# Patient Record
Sex: MALE | Race: WHITE
[De-identification: names, ages, dates, MRNs, and addresses within clinical notes are randomized per-mention and may not be internally consistent; named-entity substitution may affect disease eponyms.]

---

## 2017-12-23 ENCOUNTER — HOSPITAL ENCOUNTER (EMERGENCY)
Dept: HOSPITAL 25 - UCCORT | Age: 37
Discharge: HOME | End: 2017-12-23
Payer: COMMERCIAL

## 2017-12-23 DIAGNOSIS — R05: Primary | ICD-10-CM

## 2017-12-23 DIAGNOSIS — M54.6: ICD-10-CM

## 2017-12-23 PROCEDURE — G0463 HOSPITAL OUTPT CLINIC VISIT: HCPCS

## 2017-12-23 PROCEDURE — 99202 OFFICE O/P NEW SF 15 MIN: CPT

## 2017-12-23 NOTE — UC
Respiratory Complaint HPI





- HPI Summary


HPI Summary: 





37 YEAR OLD MALE PRESENTS WITH COMPLAINS OF COUGH AND LEFT SIDED BACK PAIN. 





- History of Current Complaint


Stated Complaint: COUGH


Time Seen by Provider: 12/23/17 15:58


Hx Obtained From: Patient


Onset/Duration: Sudden Onset


Severity Initially: Moderate


Severity Currently: Moderate


Aggravating Factors: Exertion, Deep Breaths


Associated Signs And Symptoms: Positive: Wheezing





- Allergies/Home Medications


Allergies/Adverse Reactions: 


 Allergies











Allergy/AdvReac Type Severity Reaction Status Date / Time


 


Penicillins Allergy  Hives Verified 12/23/17 16:15


 


Sulfa Antibiotics Allergy  Swelling Verified 12/23/17 16:15





   Of  





   Face,Lips,&  





   Throat  











Home Medications: 


 Home Medications





Dextromethorphan HBr [Cough Relief] 15 mg PO ONCE PRN 12/23/17 [History 

Confirmed 12/23/17]


Guaifenesin [Guaifenesin ER] 600 mg PO ONCE PRN 12/23/17 [History Confirmed 12/ 23/17]


Multiple Vitamin [Multi Vitamin] 1 tab PO DAILY 12/23/17 [History Confirmed 12/ 23/17]











PMH/Surg Hx/FS Hx/Imm Hx


Previously Healthy: Yes





- Surgical History


Surgical History: None





- Family History


Known Family History: Positive: None





- Social History


Alcohol Use: None





Review of Systems


Constitutional: Negative


Skin: Negative


Eyes: Negative


ENT: Nasal Discharge, Sinus Congestion, Sinus Pain/Tenderness


Respiratory: Cough


Cardiovascular: Negative


Gastrointestinal: Negative


Genitourinary: Negative


Motor: Negative


Neurovascular: Negative


Musculoskeletal: Negative


Neurological: Negative


Psychological: Negative


All Other Systems Reviewed And Are Negative: Yes





Physical Exam


Triage Information Reviewed: Yes


Vital Signs Reviewed: Yes


Eye Exam: Normal


ENT: Positive: Pharyngeal erythema, Nasal congestion, Nasal drainage


Dental Exam: Normal


Neck exam: Normal


Neck: Positive: 1


Respiratory Exam: Normal


Cardiovascular Exam: Normal


Abdominal Exam: Normal


Musculoskeletal Exam: Normal


Neurological Exam: Normal


Psychological Exam: Normal


Skin Exam: Normal





Respiratory Course/Dx





- Differential Dx/Diagnosis


Provider Diagnoses: COUGH.  LEFT UPPER BACK PAIN





Discharge





- Discharge Plan


Condition: Stable


Disposition: HOME


Prescriptions: 


Albuterol HFA INHALER* [Ventolin HFA Inhaler*] 1 puff INH Q6H PRN #1 mdi


 PRN Reason: Wheezing


Azithromyxin COBY (NF) [Z-Coby (Zithromax) 250 mg tabs #6] 2 tab PO .TODAY, THEN 

1 DAILY #6 tab


Guaifenesin-Codeine [Cheratussin AC] 1 syp PO Q8H PRN #120 ml MDD 15 ml


 PRN Reason: Cough


LoraTADine TAB(NF) [Claritin 10 MG TAB(NF)] 10 mg PO DAILY #30 tab


Patient Education Materials:  Acute Bronchitis (ED)


Referrals: 


Martin Zarate DO [Primary Care Provider] -

## 2018-01-17 ENCOUNTER — HOSPITAL ENCOUNTER (EMERGENCY)
Dept: HOSPITAL 25 - UCCORT | Age: 38
Discharge: HOME | End: 2018-01-17
Payer: COMMERCIAL

## 2018-01-17 DIAGNOSIS — Z88.0: ICD-10-CM

## 2018-01-17 DIAGNOSIS — Z87.891: ICD-10-CM

## 2018-01-17 DIAGNOSIS — J40: Primary | ICD-10-CM

## 2018-01-17 DIAGNOSIS — Z88.2: ICD-10-CM

## 2018-01-17 PROCEDURE — 71046 X-RAY EXAM CHEST 2 VIEWS: CPT

## 2018-01-17 PROCEDURE — G0463 HOSPITAL OUTPT CLINIC VISIT: HCPCS

## 2018-01-17 PROCEDURE — 99212 OFFICE O/P EST SF 10 MIN: CPT

## 2018-01-17 NOTE — UC
Respiratory Complaint HPI





- HPI Summary


HPI Summary: 





Pt c/o cough and wheezing X 2 weeks. Was treated last week with Zpac and 

albuterol inhaler and loratadine.Pt works at a nursing home





- History of Current Complaint


Hx Obtained From: Patient


Onset/Duration: Gradual Onset, Lasting Weeks, Still Present


Timing: Intermittent Episodes


Severity Initially: Mild


Severity Currently: Moderate


Character: Cough: Nonproductive


Aggravating Factors: Exertion, Deep Breaths, Recumbent Position


Alleviating Factors: Nothing


Associated Signs And Symptoms: Positive: Wheezing, URI, Nasal Congestion





- Risk Factors


Pulmonary Embolism Risk Factors: Negative


Cardiac Risk Factors: Negative


Pseudomonas Risk Factors: Negative


Tuberculosis Risk Factors: Negative





<Cecille Richards NP - Last Filed: 01/17/18 12:15>





<Kathy Townsend - Last Filed: 01/17/18 12:22>





- History of Current Complaint


Chief Complaint: UCRespiratory


Stated Complaint: UPPER RESP RECHECK


Time Seen by Provider: 01/17/18 10:50





- Allergies/Home Medications


Allergies/Adverse Reactions: 


 Allergies











Allergy/AdvReac Type Severity Reaction Status Date / Time


 


Penicillins Allergy  Hives Verified 01/17/18 10:09


 


Sulfa Antibiotics Allergy  Swelling Verified 01/17/18 10:09





   Of  





   Face,Lips,&  





   Throat  














PMH/Surg Hx/FS Hx/Imm Hx


Previously Healthy: Yes





- Surgical History


Surgical History: None


Surgery Procedure, Year, and Place: B/L EAR TUBES





- Family History


Known Family History: Positive: Cardiac Disease





- Social History


Occupation: Employed Full-time


Lives: With Family


Alcohol Use: Rare


Substance Use Type: None


Smoking Status (MU): Former Smoker


Have You Smoked in the Last Year: No


When Did the Patient Quit Smoking/Using Tobacco: 10+ YRS





<Cecille Richards NP - Last Filed: 01/17/18 12:15>





Review of Systems


Constitutional: Chills, Fatigue


Skin: Negative


Eyes: Negative


ENT: Sinus Congestion


Respiratory: Shortness Of Breath, Cough


Cardiovascular: Negative


Gastrointestinal: Negative


Genitourinary: Negative


Motor: Negative


Neurovascular: Negative


Musculoskeletal: Negative


Neurological: Negative


Psychological: Negative


Is Patient Immunocompromised?: No


All Other Systems Reviewed And Are Negative: Yes





<Cecille Richards NP - Last Filed: 01/17/18 12:15>





Physical Exam


Triage Information Reviewed: Yes


Appearance: Well-Appearing


Vital Signs: 


 Initial Vital Signs











Temp  98.2 F   01/17/18 10:04


 


Pulse  64   01/17/18 10:04


 


Resp  18   01/17/18 10:04


 


BP  125/81   01/17/18 10:04


 


Pulse Ox  98   01/17/18 10:04











Vital Signs Reviewed: Yes


Eye Exam: Normal


ENT Exam: Other


ENT: Positive: Nasal congestion


Dental Exam: Normal


Neck exam: Normal


Respiratory Exam: Other


Respiratory: Positive: Wheezing


Cardiovascular Exam: Normal


Musculoskeletal Exam: Normal


Neurological Exam: Normal


Psychological Exam: Normal


Skin Exam: Normal





<Cecille Richards NP - Last Filed: 01/17/18 12:15>


Vital Signs: 


 Initial Vital Signs











Temp  98.2 F   01/17/18 10:04


 


Pulse  64   01/17/18 10:04


 


Resp  18   01/17/18 10:04


 


BP  125/81   01/17/18 10:04


 


Pulse Ox  98   01/17/18 10:04














<Kathy Townsend - Last Filed: 01/17/18 12:22>





UC Diagnostic Evaluation





- Laboratory


O2 Sat by Pulse Oximetry: 98





<Cecille Richards NP - Last Filed: 01/17/18 12:15>





Respiratory Course/Dx





- Differential Dx/Diagnosis


Differential Diagnosis/HQI/PQRI: Bronchitis, Pulmonary Embolism, Other - 

pneumonia


Provider Diagnoses: Bronchitis





<Cecille Richards NP - Last Filed: 01/17/18 12:15>





Discharge





<Cecille Richards NP - Last Filed: 01/17/18 12:15>





<Kathy Townsend - Last Filed: 01/17/18 12:22>





- Discharge Plan


Condition: Stable


Disposition: HOME


Prescriptions: 


DOXYcycline CAP(*) [DOXYcycline 100MG CAP(*)] 100 mg PO Q12H #14 cap


methylPREDNISolone TAB* [Medrol TAB*] 4 - 8 mg PO .SEE COBY #1 coby


Patient Education Materials:  Acute Bronchitis (ED), Bronchospasm (ED)


Referrals: 


No Primary Care Phys,NOPCP [Primary Care Provider] - If Needed





Attestation Statement


User Type: Provider - I was available for consult. This patient was seen by the 

CECILIA. The patient was not presented to, seen by, or examined by me. -Elizabeth





<Kathy Townsend - Last Filed: 01/17/18 12:22>

## 2018-01-17 NOTE — RAD
HISTORY: Shortness of breath, wheezing, dyspnea



COMPARISONS: None



VIEWS: 5: Frontal dual-energy and lateral views of the chest.



FINDINGS:

CARDIOMEDIASTINAL SILHOUETTE: The cardiomediastinal silhouette is normal.

KRISTYN: The kristyn are normal.

PLEURA: The costophrenic angles are sharp. No pleural abnormalities are noted.

LUNG PARENCHYMA: The lungs are clear.

ABDOMEN: The upper abdomen is clear. There is no subphrenic gas.

BONES AND SOFT TISSUES: No bone or soft tissue abnormalities are noted.

OTHER: None.



IMPRESSION:

NO ACTIVE CARDIOPULMONARY DISEASE.

## 2018-03-15 ENCOUNTER — HOSPITAL ENCOUNTER (EMERGENCY)
Dept: HOSPITAL 25 - UCCORT | Age: 38
Discharge: HOME | End: 2018-03-15
Payer: COMMERCIAL

## 2018-03-15 VITALS — DIASTOLIC BLOOD PRESSURE: 81 MMHG | SYSTOLIC BLOOD PRESSURE: 126 MMHG

## 2018-03-15 DIAGNOSIS — Z88.8: ICD-10-CM

## 2018-03-15 DIAGNOSIS — Z87.891: ICD-10-CM

## 2018-03-15 DIAGNOSIS — Z88.0: ICD-10-CM

## 2018-03-15 DIAGNOSIS — J06.9: Primary | ICD-10-CM

## 2018-03-15 PROCEDURE — G0463 HOSPITAL OUTPT CLINIC VISIT: HCPCS

## 2018-03-15 PROCEDURE — 99212 OFFICE O/P EST SF 10 MIN: CPT

## 2018-03-15 NOTE — UC
Respiratory Complaint HPI





- HPI Summary


HPI Summary: 





37 year old male with cough. seen here 1/17/18, for URI, Treated with Doxy and 

prednisone, continues to have cough. Works at nursing home. Here in December 

and Jan. Had Z pack then after that the other meds. Still with some cough. No 

fever. No CP. No MCKINNEY.  Xray in Jan shows NAD. He felt that the doxy helped a 

lot and almost got rid of it but since then the cough still lingering in the 

LLL and also with some chrronic back issues and works as CNA at [employer]. 


[ End ]





- History of Current Complaint


Chief Complaint: UCRespiratory


Stated Complaint: RECHECK COUGH


Time Seen by Provider: 03/15/18 19:56


Hx Obtained From: Patient


Onset/Duration: Gradual Onset


Timing: Constant


Pain Intensity: 0


Character: Cough: Nonproductive


Aggravating Factors: Exertion


Alleviating Factors: Nothing





- Allergies/Home Medications


Allergies/Adverse Reactions: 


 Allergies











Allergy/AdvReac Type Severity Reaction Status Date / Time


 


Penicillins Allergy  Hives Verified 03/15/18 20:04


 


sulfabenzamide Allergy  Swelling Verified 03/15/18 20:04





   Of  





   Face,Lips,&  





   Throat  














PMH/Surg Hx/FS Hx/Imm Hx


Previously Healthy: Yes





- Surgical History


Surgical History: None


Surgery Procedure, Year, and Place: B/L EAR TUBES





- Family History


Known Family History: Positive: None, Cardiac Disease





- Social History


Occupation: Employed Full-time


Lives: With Family


Alcohol Use: Rare


Substance Use Type: None


Smoking Status (MU): Former Smoker


Have You Smoked in the Last Year: No


When Did the Patient Quit Smoking/Using Tobacco: 10+ YRS





Review of Systems


Constitutional: Fatigue


Respiratory: Cough


Is Patient Immunocompromised?: No


All Other Systems Reviewed And Are Negative: Yes





Physical Exam


Triage Information Reviewed: Yes


Appearance: Well-Appearing, No Pain Distress, Well-Nourished


Vital Signs: 


 Initial Vital Signs











Temp  98.2 F   03/15/18 19:56


 


Pulse  76   03/15/18 19:56


 


Resp  16   03/15/18 19:56


 


BP  126/81   03/15/18 19:56


 


Pulse Ox  99   03/15/18 19:56











Vital Signs Reviewed: Yes


Eye Exam: Normal


ENT Exam: Normal


Dental Exam: Normal


Neck exam: Normal


Neck: Positive: 1


Respiratory Exam: Normal


Cardiovascular Exam: Normal


Musculoskeletal Exam: Normal


Neurological Exam: Normal


Psychological Exam: Normal


Skin Exam: Normal





UC Diagnostic Evaluation





- Laboratory


O2 Sat by Pulse Oximetry: 99





Respiratory Course/Dx





- Course


Course Of Treatment: with the complaint of cough that persisted / worsened 

after finishing the doxy will offer cough med, and if Sx persist or worsen , or 

develop fever then start doxy. he is aware and agree to plan. he is also 

worried as he has been exposed to up to 6 people with flu at work over the past 

month or 2 . he needs to go back to PCP as he did not the past 2 visit -- he 

will call dr le office to f/u -- he may need PFT's  or Pulm referral / 

further imaging





- Differential Dx/Diagnosis


Differential Diagnosis/HQI/PQRI: Asthma, Bronchitis, Lower Resp Infection


Provider Diagnoses: URI





Discharge





- Discharge Plan


Condition: Good


Disposition: HOME


Prescriptions: 


Benzonatate CAP* [Tessalon 100 MG CAP*] 100 mg PO TID #20 cap


Doxycycline Monohydrate 100 mg PO BID #20 cap


Patient Education Materials:  Upper Respiratory Infection (ED)


Referrals: 


No Primary Care Phys,NOPCP [Primary Care Provider] - 4 Days (Please call Dr Le's office for follow up )

## 2020-02-28 ENCOUNTER — HOSPITAL ENCOUNTER (EMERGENCY)
Dept: HOSPITAL 25 - UCCORT | Age: 40
Discharge: HOME | End: 2020-02-28
Payer: COMMERCIAL

## 2020-02-28 VITALS — SYSTOLIC BLOOD PRESSURE: 147 MMHG | DIASTOLIC BLOOD PRESSURE: 87 MMHG

## 2020-02-28 DIAGNOSIS — Z87.891: ICD-10-CM

## 2020-02-28 DIAGNOSIS — Z88.0: ICD-10-CM

## 2020-02-28 DIAGNOSIS — H92.02: ICD-10-CM

## 2020-02-28 DIAGNOSIS — Z88.2: ICD-10-CM

## 2020-02-28 DIAGNOSIS — H61.22: Primary | ICD-10-CM

## 2020-02-28 PROCEDURE — 99212 OFFICE O/P EST SF 10 MIN: CPT

## 2020-02-28 PROCEDURE — G0463 HOSPITAL OUTPT CLINIC VISIT: HCPCS

## 2020-02-28 NOTE — UC
Ear Complaint HPI





- HPI Summary


HPI Summary: 





Pt presents with c/o left ear pain, and slight loss of hearing, with feeling of 

crackling and popping in left ear. Pt has hx cerumen impaction. 





- History of Current Complaint


Chief Complaint: UCEar


Stated Complaint: LEFT EAR


Time Seen by Provider: 02/28/20 13:03


Hx Obtained From: Patient


Onset/Duration: Gradual Onset, Lasting Days, Still Present


Severity Initially: Mild


Severity Currently: Mild


Pain Intensity: 0


Associated Signs/Symptoms: Positive: Hearing Loss





- Allergies/Home Medications


Allergies/Adverse Reactions: 


 Allergies











Allergy/AdvReac Type Severity Reaction Status Date / Time


 


Sulfa (Sulfonamide Allergy Unknown tightness Verified 02/28/20 12:45





Antibiotics)   of throat  


 


Penicillins Allergy  Hives Verified 02/28/20 12:43











Home Medications: 


 Home Medications





Multiple Vitamin [Multi Vitamin] 1 tab PO DAILY 12/23/17 [History Confirmed 02/ 28/20]











PMH/Surg Hx/FS Hx/Imm Hx


Previously Healthy: Yes





- Surgical History


Surgical History: None


Surgery Procedure, Year, and Place: B/L EAR TUBES





- Family History


Known Family History: Positive: Cardiac Disease





- Social History


Occupation: Employed Full-time


Lives: With Family


Alcohol Use: Rare


Substance Use Type: None


Smoking Status (MU): Former Smoker


Have You Smoked in the Last Year: No


When Did the Patient Quit Smoking/Using Tobacco: 10+ YRS





Review of Systems


All Other Systems Reviewed And Are Negative: Yes


Constitutional: Positive: Negative


Skin: Positive: Negative


Eyes: Positive: Negative


ENT: Positive: Ear Ache, Other - hearing loss left ear


Respiratory: Positive: Negative


Cardiovascular: Positive: Negative


Gastrointestinal: Positive: Negative


Genitourinary: Positive: Negative


Motor: Positive: Negative


Neurovascular: Positive: Negative


Musculoskeletal: Positive: Negative


Neurological/Mental Status: Positive: Negative


Psychological: Positive: Negative


Is Patient Immunocompromised?: No





Physical Exam


Triage Information Reviewed: Yes


Appearance: Well-Appearing


Vital Signs: 


 Initial Vital Signs











Temp  98 F   02/28/20 12:47


 


Pulse  110   02/28/20 12:47


 


Resp  20   02/28/20 12:47


 


BP  147/87   02/28/20 12:47


 


Pulse Ox  98   02/28/20 12:47











Vital Signs Reviewed: Yes


Eye Exam: Normal


ENT: Positive: Other - cerumen left ear


Dental Exam: Normal


Neck exam: Normal


Respiratory Exam: Normal


Cardiovascular Exam: Normal


Musculoskeletal Exam: Normal


Neurological Exam: Normal


Psychological Exam: Normal


Skin Exam: Normal





Ear Complaint Course/Dx





- Differential Dx/Diagnosis


Differential Diagnosis/HQI/PQRI: Cerumen Impaction, Otitis Externa, Otitis Media


Provider Diagnosis: 


 Impacted cerumen of left ear








Discharge ED





- Sign-Out/Discharge


Documenting (check all that apply): Patient Departure


All imaging exams completed and their final reports reviewed: No Studies





- Discharge Plan


Condition: Stable


Disposition: HOME


Patient Education Materials:  Cerumen Impaction (ED)


Referrals: 


[facility] [place] [facility] [place] - If Needed


No Primary Care Phys,NOPCP [Primary Care Provider] - 





- Billing Disposition and Condition


Condition: STABLE


Disposition: Home